# Patient Record
Sex: FEMALE | Race: WHITE | NOT HISPANIC OR LATINO | URBAN - METROPOLITAN AREA
[De-identification: names, ages, dates, MRNs, and addresses within clinical notes are randomized per-mention and may not be internally consistent; named-entity substitution may affect disease eponyms.]

---

## 2024-08-15 ENCOUNTER — APPOINTMENT (OUTPATIENT)
Dept: ORTHOPEDIC SURGERY | Facility: CLINIC | Age: 44
End: 2024-08-15
Payer: COMMERCIAL

## 2024-10-14 ENCOUNTER — TELEPHONE (OUTPATIENT)
Dept: ORTHOPEDIC SURGERY | Facility: HOSPITAL | Age: 44
End: 2024-10-14
Payer: COMMERCIAL

## 2024-10-14 NOTE — TELEPHONE ENCOUNTER
Called patient to find out if it was one or both hips and if she had copies of her outside imaging.  She said the R>L but both were an issue.  She stated that she contacted [facility] to have copies of her imaging sent to us.  I looked in pacs and do see and MRI of the right hip and some bilateral hip x-rays.  She said she doesn't think that the hip x-rays were done standing.  Also looks like she doesn't have all of the [place] views.  Let her know that we would probably have to do more tomorrow.  She understood. CT

## 2024-10-15 ENCOUNTER — APPOINTMENT (OUTPATIENT)
Dept: ORTHOPEDIC SURGERY | Facility: CLINIC | Age: 44
End: 2024-10-15
Payer: COMMERCIAL

## 2024-10-15 ENCOUNTER — HOSPITAL ENCOUNTER (OUTPATIENT)
Dept: RADIOLOGY | Facility: CLINIC | Age: 44
Discharge: HOME | End: 2024-10-15
Payer: COMMERCIAL

## 2024-10-15 VITALS — BODY MASS INDEX: 30.9 KG/M2 | WEIGHT: 181 LBS | HEIGHT: 64 IN

## 2024-10-15 DIAGNOSIS — M76.891 ADHESIVE CAPSULITIS OF RIGHT HIP: ICD-10-CM

## 2024-10-15 DIAGNOSIS — M25.552 BILATERAL HIP PAIN: ICD-10-CM

## 2024-10-15 DIAGNOSIS — M25.552 BILATERAL HIP PAIN: Primary | ICD-10-CM

## 2024-10-15 DIAGNOSIS — M25.551 BILATERAL HIP PAIN: ICD-10-CM

## 2024-10-15 DIAGNOSIS — M25.851 FEMOROACETABULAR IMPINGEMENT OF RIGHT HIP: ICD-10-CM

## 2024-10-15 DIAGNOSIS — M25.551 BILATERAL HIP PAIN: Primary | ICD-10-CM

## 2024-10-15 PROCEDURE — 99203 OFFICE O/P NEW LOW 30 MIN: CPT | Performed by: ORTHOPAEDIC SURGERY

## 2024-10-15 PROCEDURE — 3008F BODY MASS INDEX DOCD: CPT | Performed by: ORTHOPAEDIC SURGERY

## 2024-10-15 PROCEDURE — 73523 X-RAY EXAM HIPS BI 5/> VIEWS: CPT

## 2024-10-15 PROCEDURE — 73523 X-RAY EXAM HIPS BI 5/> VIEWS: CPT | Mod: BILATERAL PROCEDURE | Performed by: RADIOLOGY

## 2024-10-15 NOTE — PROGRESS NOTES
HPI  This is a pleasant 44 y.o. female here today for further evaluation of right hip pain.  She was recently seen at [facility] by Dr. Pineda.  She is here for a second opinion.  She has had ongoing right anterior and posterior hip pain for many years.  She is diabetic and while her A1c's have improved to 8.4 most recently, she has had ongoing difficulty with keeping her blood sugar under control.  At 1 point her hemoglobin A1c was 11.  She reports that she almost went into DKA during a prior cortisone injection.  She has never had a cortisone injection of her right hip.      History reviewed. No pertinent past medical history.    History reviewed. No pertinent surgical history.    Social History     Tobacco Use    Smoking status: Former     Types: Cigarettes           ROS  Reviewed and all pertinent positives mentioned in HPI.      EXAM  Patient is in no acute distress.  They are alert and oriented x3.  They are of normal mood and affect.  They are in no acute distress.  The patient's limb is warm and well-perfused.  They have intact sensation to light touch in all lower extremity dermatomes.  She has a positive anterior impingement sign.  Hip flexion to 110 degrees, hip extension to 10 degrees.  External rotation to 40 degrees, internal rotation to 5 degrees.  Diffuse pain with range of motion of the right hip.      IMAGING  Imaging reviewed today reveal no gross fracture or dislocation.  Acetabular index 3 degrees.  Center edge angle 25 degrees.  Alpha angle 50.  X-ray with mild to moderate arthritic changes of bilateral hips, right greater than left.      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  An in-depth discussion was had with the patient discussing the etiology of her disease, as well as the risks of surgery.  Given that she has arthritic changes to her right  joint, she is not currently a good candidate for hip arthroscopy and femoroplasty.  We will proceed with nonoperative interventions including physical therapy which she has not trialed yet.  She will follow-up after about a month of working on her physical therapy, and will continue to work on her diabetes control given that it is still currently above an 8.

## 2025-09-30 ENCOUNTER — APPOINTMENT (OUTPATIENT)
Dept: NEUROSURGERY | Facility: CLINIC | Age: 45
End: 2025-09-30
Payer: COMMERCIAL